# Patient Record
Sex: FEMALE | Race: WHITE | NOT HISPANIC OR LATINO | Employment: UNEMPLOYED | URBAN - METROPOLITAN AREA
[De-identification: names, ages, dates, MRNs, and addresses within clinical notes are randomized per-mention and may not be internally consistent; named-entity substitution may affect disease eponyms.]

---

## 2023-10-26 ENCOUNTER — ANCILLARY PROCEDURE (OUTPATIENT)
Dept: RADIOLOGY | Facility: CLINIC | Age: 53
End: 2023-10-26
Payer: COMMERCIAL

## 2023-10-26 ENCOUNTER — OFFICE VISIT (OUTPATIENT)
Dept: ORTHOPEDIC SURGERY | Facility: CLINIC | Age: 53
End: 2023-10-26
Payer: COMMERCIAL

## 2023-10-26 DIAGNOSIS — S92.002A AVULSION FRACTURE OF CALCANEUS, LEFT, CLOSED, INITIAL ENCOUNTER: Primary | ICD-10-CM

## 2023-10-26 DIAGNOSIS — M25.572 ACUTE LEFT ANKLE PAIN: ICD-10-CM

## 2023-10-26 DIAGNOSIS — M79.672 LEFT FOOT PAIN: ICD-10-CM

## 2023-10-26 DIAGNOSIS — S82.892A AVULSION FRACTURE OF ANKLE, LEFT, CLOSED, INITIAL ENCOUNTER: ICD-10-CM

## 2023-10-26 PROCEDURE — 27786 TREATMENT OF ANKLE FRACTURE: CPT | Performed by: FAMILY MEDICINE

## 2023-10-26 PROCEDURE — 73630 X-RAY EXAM OF FOOT: CPT | Mod: LEFT SIDE | Performed by: FAMILY MEDICINE

## 2023-10-26 PROCEDURE — 73630 X-RAY EXAM OF FOOT: CPT | Mod: LT,FY

## 2023-10-26 PROCEDURE — 28400 CLTX CALCANEAL FX W/O MNPJ: CPT | Mod: LT | Performed by: FAMILY MEDICINE

## 2023-10-26 PROCEDURE — 99213 OFFICE O/P EST LOW 20 MIN: CPT | Mod: 57 | Performed by: FAMILY MEDICINE

## 2023-10-26 PROCEDURE — 28400 CLTX CALCANEAL FX W/O MNPJ: CPT | Performed by: FAMILY MEDICINE

## 2023-10-26 PROCEDURE — 73610 X-RAY EXAM OF ANKLE: CPT | Mod: LT,FY

## 2023-10-26 PROCEDURE — 73610 X-RAY EXAM OF ANKLE: CPT | Mod: LEFT SIDE | Performed by: FAMILY MEDICINE

## 2023-10-26 PROCEDURE — 99203 OFFICE O/P NEW LOW 30 MIN: CPT | Performed by: FAMILY MEDICINE

## 2023-10-26 NOTE — PROGRESS NOTES
Acute Injury New Patient Visit    CC:   Chief Complaint   Patient presents with    Left Ankle - Pain    Left Foot - Pain       HPI: Nanette is a 53 y.o.female who presents today with new complaints of lateral sided left foot and ankle pain after tripping and falling the other day.  She was seen and evaluated in outside urgent care locally where she was provided with a boot and asked to follow-up with orthopedics.  There was concern for fracture.  She does not have images on a disc with her today.  She denies any numbness tingling burning, no history of significant injury or trauma in the past.  She does not currently take any vitamin D.  She has no known calcium deficiency.        Review of Systems   GENERAL: Negative for malaise, significant weight loss, fever  MUSCULOSKELETAL: See HPI  NEURO: Negative for numbness / tingling     Past Medical History  Past Medical History:   Diagnosis Date    Personal history of other specified conditions 02/01/2018    History of urinary frequency    Unspecified symptoms and signs involving the genitourinary system 02/01/2018    UTI symptoms       Medication review  Medication Documentation Review Audit       Reviewed by Cole C Budinsky, MD (Physician) on 10/26/23 at 1235      Medication Order Taking? Sig Documenting Provider Last Dose Status            No Medications to Display                                   Allergies  Not on File    Social History  Social History     Socioeconomic History    Marital status:      Spouse name: Not on file    Number of children: Not on file    Years of education: Not on file    Highest education level: Not on file   Occupational History    Not on file   Tobacco Use    Smoking status: Not on file    Smokeless tobacco: Not on file   Substance and Sexual Activity    Alcohol use: Not on file    Drug use: Not on file    Sexual activity: Not on file   Other Topics Concern    Not on file   Social History Narrative    Not on file     Social  Determinants of Health     Financial Resource Strain: Not on file   Food Insecurity: Not on file   Transportation Needs: Not on file   Physical Activity: Not on file   Stress: Not on file   Social Connections: Not on file   Intimate Partner Violence: Not on file   Housing Stability: Not on file       Surgical History  Past Surgical History:   Procedure Laterality Date    OTHER SURGICAL HISTORY  07/25/2019    Foot surgery    OTHER SURGICAL HISTORY  07/25/2019    Tonsillectomy with adenoidectomy    OTHER SURGICAL HISTORY  07/25/2019    Ear pressure equalization tube insertion    OTHER SURGICAL HISTORY  07/25/2019    Cyst excision       Physical Exam:  GENERAL:  Patient is awake, alert, and oriented to person place and time.  Patient appears well nourished and well kept.  Affect Calm, Not Acutely Distressed.  HEENT:  Normocephalic, Atraumatic, EOMI  CARDIOVASCULAR:  Hemodynamically stable.  RESPIRATORY:  Normal respirations with unlabored breathing.  NEURO: Gross sensation intact to the lower extremities bilaterally.  Extremity: Left ankle exam: The affected ankle was examined and inspected.  There was evidence of lateral sided soft tissue swelling and fullness with mild bruising noted.  The distal fibula had mild tenderness to palpation, the distal medial malleolus was non-tender.  Tenderness across the anterior joint space and over the soft tissues in the area of the ATFL and CFL ligaments.  Negative Heel Tap and Calcaneal Squeeze, Achilles is non-tender.  Negative Lucian´s and Hinson sign.  Negative Midfoot and distal metatarsal squeeze.  Distal pulses and sensation are intact with good distal cap refill.  Active and passive ROM and strength about the ankle is limited with Dorsiflexion, Plantarflexion, Eversion, and Inversion. Unable to tolerate full weight bearing secondary to pain.  Significant tenderness palpation over the anterior process calcaneus laterally.      Diagnostics: See dictated report from today,  discussed at length with the patient and family  XR foot left 3+ views, XR ankle left 3+ views  Interpreted By:  Budinsky, Cole,   STUDY:  XR FOOT LEFT 3+ VIEWS; XR ANKLE LEFT 3+ VIEWS;  ;  10/26/2023 11:48 am      INDICATION:  Signs/Symptoms:PAIN.      ACCESSION NUMBER(S):  [id number]; [id number]      ORDERING CLINICIAN:  COLE BUDINSKY      FINDINGS:  Left foot ankle films demonstrate presence of lateral sided soft  tissue swelling with associated distal fibular avulsion fracture and  lateral anterior process of the calcaneus avulsion fracture. Mild  chronic degenerative changes about the 1st MTP joint also  appreciated. No additional obvious acute abnormalities noted. Ankle  joint mortise intact and preserved.          Signed by: Cole Budinsky 10/26/2023 12:11 PM  Dictation workstation:   [id number]      XR ankle left 3+ views          Interpreted By:  Budinsky, Cole,   STUDY:  XR FOOT LEFT 3+ VIEWS; XR ANKLE LEFT 3+ VIEWS;  ;  10/26/2023 11:48 am      INDICATION:  Signs/Symptoms:PAIN.      ACCESSION NUMBER(S):  [id number]; [id number]      ORDERING CLINICIAN:  COLE BUDINSKY      FINDINGS:  Left foot ankle films demonstrate presence of lateral sided soft  tissue swelling with associated distal fibular avulsion fracture and  lateral anterior process of the calcaneus avulsion fracture. Mild  chronic degenerative changes about the 1st MTP joint also  appreciated. No additional obvious acute abnormalities noted. Ankle  joint mortise intact and preserved.          Signed by: Cole Budinsky 10/26/2023 12:11 PM  Dictation workstation:   [id number]         XR foot left 3+ views          Interpreted By:  Budinsky, Cole,   STUDY:  XR FOOT LEFT 3+ VIEWS; XR ANKLE LEFT 3+ VIEWS;  ;  10/26/2023 11:48 am      INDICATION:  Signs/Symptoms:PAIN.      ACCESSION NUMBER(S):  [id number]; [id number]      ORDERING CLINICIAN:  COLE BUDINSKY      FINDINGS:  Left foot ankle films demonstrate presence of lateral sided  soft  tissue swelling with associated distal fibular avulsion fracture and  lateral anterior process of the calcaneus avulsion fracture. Mild  chronic degenerative changes about the 1st MTP joint also  appreciated. No additional obvious acute abnormalities noted. Ankle  joint mortise intact and preserved.          Signed by: Cole Budinsky 10/26/2023 12:11 PM  Dictation workstation:   [id number]             Procedure: None  Procedures    Assessment:   Problem List Items Addressed This Visit    None  Visit Diagnoses       Avulsion fracture of calcaneus, left, closed, initial encounter    -  Primary    Relevant Orders    Ankle brace    Acute left ankle pain        Relevant Orders    XR ankle left 3+ views (Completed)    XR foot left 3+ views (Completed)    Ankle brace    Left foot pain        Relevant Orders    XR ankle left 3+ views (Completed)    XR foot left 3+ views (Completed)    Ankle brace    Avulsion fracture of ankle, left, closed, initial encounter        Relevant Orders    Ankle brace             Plan: At this time we will c/w nonoperative management strategies as discussed at length with the patient.  We were able to adjust her short boot that she received at the urgent care without much issue.  She will utilize ice Tylenol anti-inflammatories for pain control she was encouraged to use over-the-counter vitamin D supplementation to assist with fracture healing.  We will see her back in 4 weeks for repeat evaluation, repeat x-rays 3 views of the left foot and ankle next visit.  Hopefully transition to a lace up ankle brace and home exercises versus formal physical therapy at that time.  Orders Placed This Encounter    Ankle brace    XR ankle left 3+ views    XR foot left 3+ views      At the conclusion of the visit there were no further questions by the patient/family regarding their plan of care.  Patient was instructed to call or return with any issues, questions, or concerns regarding their injury and/or  treatment plan described above.     10/26/23 at 12:42 PM - Cole C Budinsky, MD    Office: [phone]    This note was prepared using voice recognition software.  The details of this note are correct and have been reviewed, and corrected to the best of my ability.  Some grammatical errors may persist related to the Dragon software.

## 2023-11-17 ENCOUNTER — APPOINTMENT (OUTPATIENT)
Dept: ORTHOPEDIC SURGERY | Facility: CLINIC | Age: 53
End: 2023-11-17
Payer: COMMERCIAL

## 2023-11-20 ENCOUNTER — LAB (OUTPATIENT)
Dept: LAB | Facility: LAB | Age: 53
End: 2023-11-20
Payer: COMMERCIAL

## 2023-11-20 DIAGNOSIS — L40.0 PSORIASIS VULGARIS: Primary | ICD-10-CM

## 2023-11-20 LAB
ALBUMIN SERPL BCP-MCNC: 4.2 G/DL (ref 3.4–5)
ALP SERPL-CCNC: 72 U/L (ref 33–110)
ALT SERPL W P-5'-P-CCNC: 15 U/L (ref 7–45)
ANION GAP SERPL CALC-SCNC: 10 MMOL/L (ref 10–20)
AST SERPL W P-5'-P-CCNC: 22 U/L (ref 9–39)
BASOPHILS # BLD AUTO: 0.04 X10*3/UL (ref 0–0.1)
BASOPHILS NFR BLD AUTO: 0.6 %
BILIRUB DIRECT SERPL-MCNC: 0.1 MG/DL (ref 0–0.3)
BILIRUB SERPL-MCNC: 0.6 MG/DL (ref 0–1.2)
BUN SERPL-MCNC: 8 MG/DL (ref 6–23)
CALCIUM SERPL-MCNC: 9.6 MG/DL (ref 8.6–10.3)
CHLORIDE SERPL-SCNC: 102 MMOL/L (ref 98–107)
CO2 SERPL-SCNC: 27 MMOL/L (ref 21–32)
CREAT SERPL-MCNC: 0.76 MG/DL (ref 0.5–1.05)
EOSINOPHIL # BLD AUTO: 0.26 X10*3/UL (ref 0–0.7)
EOSINOPHIL NFR BLD AUTO: 4.2 %
ERYTHROCYTE [DISTWIDTH] IN BLOOD BY AUTOMATED COUNT: 13.7 % (ref 11.5–14.5)
GFR SERPL CREATININE-BSD FRML MDRD: >90 ML/MIN/1.73M*2
GLUCOSE SERPL-MCNC: 83 MG/DL (ref 74–99)
HBV CORE AB SER QL: NONREACTIVE
HBV SURFACE AB SER-ACNC: <3.1 MIU/ML
HBV SURFACE AG SERPL QL IA: NONREACTIVE
HCT VFR BLD AUTO: 43.5 % (ref 36–46)
HCV AB SER QL: NONREACTIVE
HGB BLD-MCNC: 14.2 G/DL (ref 12–16)
IMM GRANULOCYTES # BLD AUTO: 0.01 X10*3/UL (ref 0–0.7)
IMM GRANULOCYTES NFR BLD AUTO: 0.2 % (ref 0–0.9)
LYMPHOCYTES # BLD AUTO: 1.56 X10*3/UL (ref 1.2–4.8)
LYMPHOCYTES NFR BLD AUTO: 25 %
MCH RBC QN AUTO: 31.6 PG (ref 26–34)
MCHC RBC AUTO-ENTMCNC: 32.6 G/DL (ref 32–36)
MCV RBC AUTO: 97 FL (ref 80–100)
MONOCYTES # BLD AUTO: 0.55 X10*3/UL (ref 0.1–1)
MONOCYTES NFR BLD AUTO: 8.8 %
NEUTROPHILS # BLD AUTO: 3.81 X10*3/UL (ref 1.2–7.7)
NEUTROPHILS NFR BLD AUTO: 61.2 %
NRBC BLD-RTO: 0 /100 WBCS (ref 0–0)
PLATELET # BLD AUTO: 262 X10*3/UL (ref 150–450)
POTASSIUM SERPL-SCNC: 4.4 MMOL/L (ref 3.5–5.3)
PROT SERPL-MCNC: 7.4 G/DL (ref 6.4–8.2)
RBC # BLD AUTO: 4.49 X10*6/UL (ref 4–5.2)
SODIUM SERPL-SCNC: 135 MMOL/L (ref 136–145)
WBC # BLD AUTO: 6.2 X10*3/UL (ref 4.4–11.3)

## 2023-11-20 PROCEDURE — 82248 BILIRUBIN DIRECT: CPT

## 2023-11-20 PROCEDURE — 80053 COMPREHEN METABOLIC PANEL: CPT

## 2023-11-20 PROCEDURE — 86803 HEPATITIS C AB TEST: CPT

## 2023-11-20 PROCEDURE — 86704 HEP B CORE ANTIBODY TOTAL: CPT

## 2023-11-20 PROCEDURE — 36415 COLL VENOUS BLD VENIPUNCTURE: CPT

## 2023-11-20 PROCEDURE — 86481 TB AG RESPONSE T-CELL SUSP: CPT

## 2023-11-20 PROCEDURE — 85025 COMPLETE CBC W/AUTO DIFF WBC: CPT

## 2023-11-20 PROCEDURE — 87340 HEPATITIS B SURFACE AG IA: CPT

## 2023-11-20 PROCEDURE — 86706 HEP B SURFACE ANTIBODY: CPT

## 2023-11-22 LAB
NIL(NEG) CONTROL SPOT COUNT: ABNORMAL
PANEL A SPOT COUNT: 6
PANEL B SPOT COUNT: 9
POS CONTROL SPOT COUNT: ABNORMAL
T-SPOT. TB INTERPRETATION: POSITIVE

## 2024-02-22 ENCOUNTER — APPOINTMENT (OUTPATIENT)
Dept: DERMATOLOGY | Facility: CLINIC | Age: 54
End: 2024-02-22
Payer: COMMERCIAL